# Patient Record
Sex: FEMALE | Race: WHITE
[De-identification: names, ages, dates, MRNs, and addresses within clinical notes are randomized per-mention and may not be internally consistent; named-entity substitution may affect disease eponyms.]

---

## 2018-10-23 ENCOUNTER — HOSPITAL ENCOUNTER (INPATIENT)
Dept: HOSPITAL 97 - DSO | Age: 53
LOS: 7 days | Discharge: HOME HEALTH | DRG: 470 | End: 2018-10-30
Attending: FAMILY MEDICINE | Admitting: ORTHOPAEDIC SURGERY
Payer: COMMERCIAL

## 2018-10-23 DIAGNOSIS — D62: ICD-10-CM
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
## 2018-10-23 DIAGNOSIS — Z98.84: ICD-10-CM

## 2018-10-23 DIAGNOSIS — G89.18: ICD-10-CM

## 2018-10-23 DIAGNOSIS — I10: ICD-10-CM

## 2018-10-23 DIAGNOSIS — E66.9: ICD-10-CM
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
## 2018-10-23 DIAGNOSIS — M17.12: Primary | ICD-10-CM

## 2018-10-23 DIAGNOSIS — T84.84XA: ICD-10-CM

## 2018-10-23 LAB
BUN BLD-MCNC: 8 MG/DL (ref 7–18)
GLUCOSE SERPLBLD-MCNC: 86 MG/DL (ref 74–106)
HCT VFR BLD CALC: 38.7 % (ref 36–45)
INR BLD: 1.04
LYMPHOCYTES # SPEC AUTO: 1.6 K/UL (ref 0.7–4.9)
MCH RBC QN AUTO: 29.6 PG (ref 27–35)
MCV RBC: 87.9 FL (ref 80–100)
PMV BLD: 7.9 FL (ref 7.6–11.3)
POTASSIUM SERPL-SCNC: 4.1 MMOL/L (ref 3.5–5.1)
RBC # BLD: 4.4 M/UL (ref 3.86–4.86)
UA COMPLETE W REFLEX CULTURE PNL UR: (no result)
UA DIPSTICK W REFLEX MICRO PNL UR: (no result)

## 2018-10-23 PROCEDURE — 36415 COLL VENOUS BLD VENIPUNCTURE: CPT

## 2018-10-23 PROCEDURE — 87088 URINE BACTERIA CULTURE: CPT

## 2018-10-23 PROCEDURE — 85018 HEMOGLOBIN: CPT

## 2018-10-23 PROCEDURE — 86900 BLOOD TYPING SEROLOGIC ABO: CPT

## 2018-10-23 PROCEDURE — 71046 X-RAY EXAM CHEST 2 VIEWS: CPT

## 2018-10-23 PROCEDURE — 97163 PT EVAL HIGH COMPLEX 45 MIN: CPT

## 2018-10-23 PROCEDURE — 80048 BASIC METABOLIC PNL TOTAL CA: CPT

## 2018-10-23 PROCEDURE — 85730 THROMBOPLASTIN TIME PARTIAL: CPT

## 2018-10-23 PROCEDURE — 81015 MICROSCOPIC EXAM OF URINE: CPT

## 2018-10-23 PROCEDURE — 93005 ELECTROCARDIOGRAM TRACING: CPT

## 2018-10-23 PROCEDURE — 85025 COMPLETE CBC W/AUTO DIFF WBC: CPT

## 2018-10-23 PROCEDURE — 86901 BLOOD TYPING SEROLOGIC RH(D): CPT

## 2018-10-23 PROCEDURE — 85610 PROTHROMBIN TIME: CPT

## 2018-10-23 PROCEDURE — 86850 RBC ANTIBODY SCREEN: CPT

## 2018-10-23 PROCEDURE — 81003 URINALYSIS AUTO W/O SCOPE: CPT

## 2018-10-23 PROCEDURE — 87086 URINE CULTURE/COLONY COUNT: CPT

## 2018-10-23 PROCEDURE — 85014 HEMATOCRIT: CPT

## 2018-10-23 NOTE — RAD REPORT
EXAM DESCRIPTION:  RAD - Chest Pa And Lat (2 Views) - 10/23/2018 9:53 am

 

CLINICAL HISTORY:  preop

Chest pain.

 

COMPARISON:  Chest Pa And Lat (2 Views) dated 2/23/2017; CHEST SINGLE VIEW dated 10/7/2014; CHEST PA 
AND LAT 2 VIEW dated 7/23/2009; CHEST PA AND LAT 2 VIEW dated 5/11/2005

 

FINDINGS:  The lungs are clear. The heart is normal in size. No displaced fractures. Gastric banding 
noted.

 

IMPRESSION:  No acute or concerning finding suspected.

## 2018-10-23 NOTE — EKG
Test Date:    2018-10-23               Test Time:    09:37:51

Technician:   JENNIFER                                   

                                                     

MEASUREMENT RESULTS:                                       

Intervals:                                           

Rate:         68                                     

OR:           148                                    

QRSD:         74                                     

QT:           420                                    

QTc:          446                                    

Axis:                                                

P:            35                                     

OR:           148                                    

QRS:          21                                     

T:            22                                     

                                                     

INTERPRETIVE STATEMENTS:                                       

                                                     

Normal sinus rhythm

Low voltage QRS

Borderline ECG

Compared to ECG 10/07/2014 12:47:19

Low QRS voltage now present



Electronically Signed On 10-23-18 12:09:51 CDT by Nacho Zamora

## 2018-10-26 LAB — HCT VFR BLD CALC: 35.3 % (ref 36–45)

## 2018-10-26 PROCEDURE — 0SRD0J9 REPLACEMENT OF LEFT KNEE JOINT WITH SYNTHETIC SUBSTITUTE, CEMENTED, OPEN APPROACH: ICD-10-PCS

## 2018-10-26 RX ADMIN — HYDROMORPHONE HYDROCHLORIDE ONE MG: 1 INJECTION, SOLUTION INTRAMUSCULAR; INTRAVENOUS; SUBCUTANEOUS at 14:05

## 2018-10-26 RX ADMIN — PROMETHAZINE HYDROCHLORIDE PRN MG: 25 INJECTION INTRAMUSCULAR; INTRAVENOUS at 19:45

## 2018-10-26 RX ADMIN — CEFAZOLIN ONE MLS: 1 INJECTION, POWDER, FOR SOLUTION INTRAMUSCULAR; INTRAVENOUS; PARENTERAL at 12:46

## 2018-10-26 RX ADMIN — CEFAZOLIN ONE MLS: 1 INJECTION, POWDER, FOR SOLUTION INTRAMUSCULAR; INTRAVENOUS; PARENTERAL at 12:00

## 2018-10-26 RX ADMIN — HYDROMORPHONE HYDROCHLORIDE ONE MG: 1 INJECTION, SOLUTION INTRAMUSCULAR; INTRAVENOUS; SUBCUTANEOUS at 14:09

## 2018-10-26 RX ADMIN — DOCUSATE SODIUM SCH MG: 100 CAPSULE, LIQUID FILLED ORAL at 21:18

## 2018-10-26 RX ADMIN — HYDROMORPHONE HYDROCHLORIDE ONE MG: 1 INJECTION, SOLUTION INTRAMUSCULAR; INTRAVENOUS; SUBCUTANEOUS at 14:20

## 2018-10-26 RX ADMIN — HYDROMORPHONE HYDROCHLORIDE ONE MG: 1 INJECTION, SOLUTION INTRAMUSCULAR; INTRAVENOUS; SUBCUTANEOUS at 14:15

## 2018-10-26 RX ADMIN — CEFAZOLIN SCH MLS: 1 INJECTION, POWDER, FOR SOLUTION INTRAMUSCULAR; INTRAVENOUS; PARENTERAL at 17:55

## 2018-10-26 NOTE — XMS REPORT
[facility]

 Created on:2018



Patient:Marisela Ellsworth

Sex:Female

:1965

External Reference #:[id number]





Demographics







 Address  [address] [place] [address]

 

 Phone  [phone]

 

 Preferred Language  en

 

 Marital Status  Unknown

 

 Moravian Affiliation  Unknown

 

 Race  Unreported/Refused to Report

 

 Ethnic Group  Unknown









Author







 Organization  [employer]









Care Team Providers







 Name  Role  Phone

 

 GuyEvan  Provider Role  Unavailable









Allergies, Adverse Reactions, Alerts







 Substance  Reaction  Event Type

 

 codeine  Info Not Available  Drug Allergy







Problems







 Problem Type  Condition  Code  Onset Dates  Condition Status

 

 Assessment  Primary osteoarthritis of left knee  M17.12    Active

 

 Assessment  Pain in left knee  M25.562    Active

 

 Problem  Primary osteoarthritis of right  M17.11    Active



   knee      

 

 Problem  Primary osteoarthritis of both  M17.0    Active



   knees      

 

 Problem  Primary osteoarthritis of left knee  M17.12    Active

 

 Problem  Pain in left knee  M25.562    Active

 

 Problem  Pain in right knee  M25.561    Active

 

 Problem  Other chronic pain  G89.29    Active







Medications







 Medication  Code  Code  Instructions  Start  End  Status  Dosage



   System      Date  Date    

 

 Omeprazole  NDC  [id number]  40 MG Oral      Active  TK ONE C PO D

 

 Amoxicillin  NDC  [id number]  500 MG Oral      Active  TK 2 CS PO BID

 

 Phentermine HCl  NDC  [id number]  37.5 MG Oral      Active  (Schedule IV



               Drug) TK 1 T



               PO QD.

 

 Suprep Bowel Prep  NDC  [id number]  17.5-3.13-1.6      Active  U UTD BY YOUR



 Kit      GM/180ML Oral        COLONOSCOPY



               PACKET



               INSTRUCTIONS

 

 Clarithromycin  NDC  [id number]  500 MG Oral      Active  TK 1 T PO BID



               FOR 14 DAYS

 

 Metoclopramide  NDC  [id number]  10 MG Oral      Active  TK UTD PER



 HCl              YOUR



               COLONOSCOPY



               PREP PACKET

 

 Triamterene-HCTZ  NDC  [id number]  37.5-25 MG      Active  TK 1 T PO QD



       Oral        







Results

No Known Results



Summary Purpose

[employer] Submission

## 2018-10-26 NOTE — RAD REPORT
EXAM DESCRIPTION:  RAD - Knee Left 2 View - 10/26/2018 2:21 pm

 

CLINICAL HISTORY:  Placement of left total knee prosthesis

 

COMPARISON:  None.

 

FINDINGS:  A left total knee arthroplasty has been performed. Hardware is in expected location. A carina
gical drain is in place. Skin staples are noted.

 

IMPRESSION:  Postoperative left knee with no unexpected finding.

## 2018-10-26 NOTE — XMS REPORT
[facility]

 Created on:2018



Patient:Marisela Ellsworth

Sex:Female

:1965

External Reference #:[id number]





Demographics







 Address  [address] [place] [address]

 

 Phone  [phone]

 

 Preferred Language  en

 

 Marital Status  Unknown

 

 Uatsdin Affiliation  Unknown

 

 Race  Unreported/Refused to Report

 

 Ethnic Group  Unknown









Author







 Organization  [employer]









Care Team Providers







 Name  Role  Phone

 

 Evan Guy  Provider Role  Unavailable









Allergies, Adverse Reactions, Alerts







 Substance  Reaction  Event Type

 

 codeine  Info Not Available  Drug Allergy







Problems







 Problem Type  Condition  Code  Onset Dates  Condition Status

 

 Assessment  Pain in left knee  M25.562    Active

 

 Assessment  Primary osteoarthritis of left knee  M17.12    Active

 

 Problem  Primary osteoarthritis of right  M17.11    Active



   knee      

 

 Problem  Primary osteoarthritis of both  M17.0    Active



   knees      

 

 Problem  Primary osteoarthritis of left knee  M17.12    Active

 

 Problem  Pain in left knee  M25.562    Active

 

 Problem  Pain in right knee  M25.561    Active

 

 Problem  Other chronic pain  G89.29    Active







Medications







 Medication  Code System  Code  Instructions  Start Date  End Date  Status  
Dosage

 

 Omeprazole  NDC  0        Active  not defined







Results

No Known Results



Summary Purpose

[employer] Submission

## 2018-10-26 NOTE — XMS REPORT
[facility]

 Created on:2018



Patient:Marisela Ellsworth

Sex:Female

:1965

External Reference #:[id number]





Demographics







 Address  [address] [place] [address]

 

 Phone  [phone]

 

 Preferred Language  en

 

 Marital Status  Unknown

 

 Buddhism Affiliation  Unknown

 

 Race  Unreported/Refused to Report

 

 Ethnic Group  Unknown









Author







 Organization  [employer]









Care Team Providers







 Name  Role  Phone

 

 Evan Guy  Provider Role  Unavailable









Allergies, Adverse Reactions, Alerts







 Substance  Reaction  Event Type

 

 codeine  Info Not Available  Drug Allergy







Problems







 Problem Type  Condition  Code  Onset Dates  Condition Status

 

 Assessment  Pain in right knee  M25.561    Active

 

 Assessment  Other chronic pain  G89.29    Active

 

 Assessment  Pain in left knee  M25.562    Active

 

 Assessment  Primary osteoarthritis of right  M17.11    Active



   knee      

 

 Assessment  Primary osteoarthritis of left knee  M17.12    Active

 

 Problem  Primary osteoarthritis of right  M17.11    Active



   knee      

 

 Problem  Primary osteoarthritis of both  M17.0    Active



   knees      

 

 Problem  Primary osteoarthritis of left knee  M17.12    Active

 

 Problem  Pain in left knee  M25.562    Active

 

 Problem  Pain in right knee  M25.561    Active

 

 Problem  Other chronic pain  G89.29    Active







Medications







 Medication  Code  Code  Instructions  Start  End  Status  Dosage



   System      Date  Date    

 

 Amoxicillin  NDC  [id number]  500 MG Oral      Active  TK 2 CS PO BID

 

 Clarithromycin  NDC  [id number]  500 MG Oral      Active  TK 1 T PO BID



               FOR 14 DAYS

 

 Triamterene-HCTZ  NDC  [id number]  37.5-25 MG      Active  TK 1 T PO QD



       Oral        

 

 Metoclopramide  NDC  [id number]  10 MG Oral      Active  TK UTD PER



 HCl              YOUR



               COLONOSCOPY



               PREP PACKET

 

 Suprep Bowel Prep  NDC  [id number]  17.5-3.13-1.6      Active  U UTD BY YOUR



 Kit      GM/180ML Oral        COLONOSCOPY



               PACKET



               INSTRUCTIONS

 

 Phentermine HCl  NDC  [id number]  37.5 MG Oral      Active  (Schedule IV



               Drug) TK 1 T



               PO QD.

 

 Omeprazole  NDC  [id number]  40 MG Oral      Active  TK ONE C PO D







Results

No Known Results



Summary Purpose

[employer] Submission

## 2018-10-26 NOTE — XMS REPORT
[facility]

 Created on:2018



Patient:Marisela Ellsworth

Sex:Female

:1965

External Reference #:[id number]





Demographics







 Address  [address] [place] [address]

 

 Phone  [phone]

 

 Preferred Language  en

 

 Marital Status  Unknown

 

 Sikh Affiliation  Unknown

 

 Race  Unreported/Refused to Report

 

 Ethnic Group  Unknown









Author







 Organization  [employer]









Care Team Providers







 Name  Role  Phone

 

 Guy Evan  Provider Role  Unavailable









Allergies, Adverse Reactions, Alerts







 Substance  Reaction  Event Type

 

 codeine  Info Not Available  Drug Allergy







Problems







 Problem Type  Condition  Code  Onset Dates  Condition Status

 

 Assessment  Primary osteoarthritis of right  M17.11    Active



   knee      

 

 Assessment  Pain in right knee  M25.561    Active

 

 Assessment  Primary osteoarthritis of left knee  M17.12    Active

 

 Problem  Other chronic pain  G89.29    Active

 

 Problem  Pain in left knee  M25.562    Active

 

 Problem  Pain in right knee  M25.561    Active

 

 Assessment  Pain in left knee  M25.562    Active

 

 Assessment  Other chronic pain  G89.29    Active

 

 Problem  Primary osteoarthritis of both  M17.0    Active



   knees      







Medications







 Medication  Code  Code  Instructions  Start  End  Status  Dosage



   System      Date  Date    

 

 Omeprazole  NDC  [id number]  40 MG Oral      Active  TK ONE C PO D

 

 Clarithromycin  NDC  [id number]  500 MG Oral      Active  TK 1 T PO BID



               FOR 14 DAYS

 

 Triamterene-HCTZ  NDC  [id number]  37.5-25 MG      Active  TK 1 T PO QD



       Oral        

 

 Amoxicillin  NDC  [id number]  500 MG Oral      Active  TK 2 CS PO BID

 

 Suprep Bowel Prep  NDC  [id number]  17.5-3.13-1.6      Active  U UTD BY YOUR



 Kit      GM/180ML Oral        COLONOSCOPY



               PACKET



               INSTRUCTIONS

 

 Metoclopramide  NDC  [id number]  10 MG Oral      Active  TK UTD PER



 HCl              YOUR



               COLONOSCOPY



               PREP PACKET

 

 Phentermine HCl  NDC  [id number]  37.5 MG Oral      Active  (Schedule IV



               Drug) TK 1 T



               PO QD.







Results

No Known Results



Summary Purpose

[employer] Submission

## 2018-10-27 LAB — HCT VFR BLD CALC: 31.9 % (ref 36–45)

## 2018-10-27 RX ADMIN — HYDROCODONE BITARTRATE AND ACETAMINOPHEN PRN TAB: 7.5; 325 TABLET ORAL at 21:11

## 2018-10-27 RX ADMIN — HYDROCODONE BITARTRATE AND ACETAMINOPHEN PRN TAB: 7.5; 325 TABLET ORAL at 09:42

## 2018-10-27 RX ADMIN — RIVAROXABAN SCH MG: 10 TABLET, FILM COATED ORAL at 06:05

## 2018-10-27 RX ADMIN — DOCUSATE SODIUM SCH MG: 100 CAPSULE, LIQUID FILLED ORAL at 09:42

## 2018-10-27 RX ADMIN — DOCUSATE SODIUM SCH MG: 100 CAPSULE, LIQUID FILLED ORAL at 21:11

## 2018-10-27 RX ADMIN — PROMETHAZINE HYDROCHLORIDE PRN MG: 25 INJECTION INTRAMUSCULAR; INTRAVENOUS at 18:20

## 2018-10-27 RX ADMIN — CEFAZOLIN SCH MLS: 1 INJECTION, POWDER, FOR SOLUTION INTRAMUSCULAR; INTRAVENOUS; PARENTERAL at 01:41

## 2018-10-27 RX ADMIN — HYDROCODONE BITARTRATE AND ACETAMINOPHEN PRN TAB: 7.5; 325 TABLET ORAL at 16:45

## 2018-10-27 NOTE — P.PN
Subjective


Date of Service: 10/27/18


Chief Complaint: TKR


Subjective: Tolerating diet, Ambulating, Improving, Working w/ PT, Doing well





Review of Systems


10-point ROS is otherwise unremarkable





Physical Examination





- Vital Signs


Temperature: 98.4 F


Blood Pressure: 108/58


Pulse: 81


Respirations: 16


Pulse Ox (%): 96





- Physical Exam


General: Alert, In no apparent distress


HEENT: Atraumatic, PERRLA, EOMI


Neck: Supple, JVD not distended


Respiratory: Clear to auscultation bilaterally, Normal air movement


Cardiovascular: Regular rate/rhythm, Normal S1 S2


Gastrointestinal: Normal bowel sounds, No tenderness


Musculoskeletal: Other (Left Knee Wrapped with Ace bandage. SABA drain with 

Serasangious Fluids )


Integumentary: No rashes


Neurological: Normal speech, Normal tone, Normal affect


Lymphatics: No axilla or inguinal lymphadenopathy





- Studies


Laboratory Data (last 24 hrs)





10/27/18 06:02: Hgb 10.7 L, Hct 31.9 L


10/26/18 13:55: Hgb 11.7 L, Hct 35.3 L





Medications List Reviewed: Yes





Assessment And Plan





- Current Problems (Diagnosis)


(1) Status post total left knee replacement


Current Visit: Yes   Status: Acute   


Plan: 


S/p TKR with ortho


-PT consulted. 


-Pain mgmt 


-CPM and Plan per Ortho 











(2) HTN (hypertension)


Current Visit: Yes   Status: Acute   


Plan: 


No H/o HTN. Low BP 


-Continue to monitor. 








Qualifiers: 


   Hypertension type: essential hypertension   Qualified Code(s): I10 - 

Essential (primary) hypertension   


Discharge Plan: Other


Plan to discharge in: 72 Hours





- Code Status/Comfort Care


Code Status Assessed: Yes


Critical Care: No

## 2018-10-27 NOTE — OP
Surgeon:  Philipp Lei MD



Preoperative Diagnosis:  Left knee degenerative joint disease.



Postoperative Diagnosis:  Left knee degenerative joint disease.



Procedure Performed:  Left total knee replacement.



First Assistant:  KASSIE Renner.



Complications:  None.



Disposition:  Recovery room, stable.



Procedure In Detail:  The patient was taken to the operative suite, placed in supine position, induce
d anesthesia.  Left knee was prepped and draped in usual sterile fashion.  No qualified resident was 
available.  A midline skin incision, medial parapatellar arthrotomy.  __________ AP and chamfer cuts 
made from intramedullary reference for 60 mm femoral component.  Proximal tibia was resected 4 mm off
 the most involved side for 67 base plate.  Trial with a 10 poly gave perfect balance of the posterio
r flexion gaps.  A 31 patella was appropriate.  Cemented technique was reserved for patella only with
 a supplemental screw fixation for the tibia.  Permanent implants were placed.  Layered closure was p
erformed.  Hemovac drain was placed above the fascia.  Skin closure was performed.  The patient was b
eing reversed from anesthesia at this time.





ALMA/RENETTA

DD:  10/26/2018 13:38:42Voice ID:  [id number]

DT:  10/27/2018 00:44:45Report ID:  [id number]

## 2018-10-27 NOTE — P.PN
Subjective


Date of Service: 10/27/18


Subjective: No C/O voiced, Tolerating diet, Ambulating, Improving, Doing well (

pain under control at this time   up with p.t. can be d/cd any  time after this 

evem=rambo)





Physical Examination





- Vital Signs


Temperature: 98.4 F


Blood Pressure: 108/58


Pulse: 81


Respirations: 16


Pulse Ox (%): 96





- Studies


Laboratory Data (last 24 hrs)





10/27/18 06:02: Hgb 10.7 L, Hct 31.9 L


10/26/18 13:55: Hgb 11.7 L, Hct 35.3 L

## 2018-10-28 LAB — HCT VFR BLD CALC: 28.3 % (ref 36–45)

## 2018-10-28 RX ADMIN — HYDROCODONE BITARTRATE AND ACETAMINOPHEN PRN TAB: 7.5; 325 TABLET ORAL at 19:57

## 2018-10-28 RX ADMIN — RIVAROXABAN SCH MG: 10 TABLET, FILM COATED ORAL at 05:00

## 2018-10-28 RX ADMIN — DOCUSATE SODIUM SCH MG: 100 CAPSULE, LIQUID FILLED ORAL at 20:01

## 2018-10-28 RX ADMIN — HYDROCODONE BITARTRATE AND ACETAMINOPHEN PRN TAB: 7.5; 325 TABLET ORAL at 05:00

## 2018-10-28 RX ADMIN — DOCUSATE SODIUM SCH MG: 100 CAPSULE, LIQUID FILLED ORAL at 08:45

## 2018-10-28 RX ADMIN — HYDROCODONE BITARTRATE AND ACETAMINOPHEN PRN TAB: 7.5; 325 TABLET ORAL at 00:58

## 2018-10-28 RX ADMIN — HYDROCODONE BITARTRATE AND ACETAMINOPHEN PRN TAB: 7.5; 325 TABLET ORAL at 15:57

## 2018-10-28 RX ADMIN — HYDROCODONE BITARTRATE AND ACETAMINOPHEN PRN TAB: 7.5; 325 TABLET ORAL at 08:45

## 2018-10-28 RX ADMIN — HYDROCODONE BITARTRATE AND ACETAMINOPHEN PRN TAB: 7.5; 325 TABLET ORAL at 11:59

## 2018-10-28 NOTE — P.CNS
Date of Consult: 10/26/18


Reason for Consult: Medical management


Requesting Physician: Philipp Lei


Chief Complaint: TKR


History of Present Illness: 





Patient is a 52-year-old female who presents to the hospital with a total knee 

replacement.  She has tolerated the procedure well however after the procedure 

she has had difficulty with her pain medication.  She has also had nausea.  We 

were consulted to help provide medical assistance in her management.  Patient 

denies any other medical issues.  She has severe osteoarthritis, and she was 

unable to ambulate any longer.  She started having back pain and hip pain. She 

was originally seeing a different physician were she was receiving injections 

which was not benefitting her so she got a 2nd opinion.  Decision was made for 

a total knee replacement as she was unable to do her job effectively and her 

quality of life was suffering significantly.


Allergies





tramadol Allergy (Verified 10/23/18 08:41)


 agitation, nausea





Home Medications: 








NK [No Home Meds]  10/23/18 








- Past Medical/Surgical History


Diabetic: No


-: h.pylori 2/2018


-: athroscopic surgery GALINA knees


-: lap band


-: tonsillectomy





- Family History


  ** Mother


Medical History: Heart disease, Lung disease, Cancer


Notes: COPD, CHF, LUPUS





  ** Father


Medical History: Lung disease


Notes: asthma





- Social History


Alcohol use: Yes


CD- Drugs: No


Caffeine use: Yes


Place of Residence: Home





Review of Systems


10-point ROS is otherwise unremarkable





Physical Examination














Temp Pulse Resp BP Pulse Ox


 


 98.2 F   88   16   128/63   97 


 


 10/28/18 00:00  10/28/18 00:00  10/28/18 00:00  10/28/18 00:00  10/27/18 20:00








General: Alert, In no apparent distress, Oriented x3


HEENT: Atraumatic, PERRLA, Mucous membr. moist/pink, EOMI, Sclerae nonicteric


Neck: Supple, 2+ carotid pulse no bruit, No LAD, Without JVD or thyroid 

abnormality


Respiratory: Clear to auscultation bilaterally, Normal air movement


Cardiovascular: Regular rate/rhythm, Normal S1 S2, No murmurs


Gastrointestinal: Normal bowel sounds, Soft and benign, Non-distended, No 

tenderness


Musculoskeletal: No clubbing, No swelling, Tenderness


Integumentary: No rashes


Neurological: Normal gait, Normal speech, Normal strength at 5/5 x4 extr, 

Normal tone, Normal affect


Lymphatics: No axilla or inguinal lymphadenopathy


Laboratory Data (last 24 hrs)





10/27/18 06:02: Hgb 10.7 L, Hct 31.9 L








- Problems


(1) Status post total left knee replacement


Current Visit: Yes   Status: Acute   


Conclusions/ Impression: 





Plan:


1. Gentle hydration


2. Pain control


3. Physical therapy


4. Passive range of motion


5. Anti emetics


6. Diet as tolerated


7. GI and DVT prophylaxis


Critical Care: No


Time Spent Managing Pts care (In Minutes): 50

## 2018-10-28 NOTE — P.PN
Subjective


Date of Service: 10/28/18


Chief Complaint: TKR


Subjective: Tolerating diet, Ambulating, Improving, Working w/ PT, Doing well





Review of Systems


10-point ROS is otherwise unremarkable





Physical Examination





- Vital Signs


Temperature: 99.3 F


Blood Pressure: 133/62


Pulse: 108


Respirations: 16


Pulse Ox (%): 94





- Physical Exam


General: Alert, In no apparent distress


HEENT: Atraumatic, PERRLA, EOMI


Neck: Supple, JVD not distended


Respiratory: Clear to auscultation bilaterally, Normal air movement


Cardiovascular: Regular rate/rhythm, Normal S1 S2


Gastrointestinal: Normal bowel sounds, No tenderness


Musculoskeletal: Other (Left Knee Wrapped with ACE wrap. SABA drain in place. )


Integumentary: No rashes


Neurological: Normal speech, Normal tone, Normal affect


Lymphatics: No axilla or inguinal lymphadenopathy





- Studies


Laboratory Data (last 24 hrs)





10/28/18 06:02: Hgb 9.6 L, Hct 28.3 L





Medications List Reviewed: Yes





Assessment And Plan





- Current Problems (Diagnosis)


(1) Status post total left knee replacement


Current Visit: Yes   Status: Acute   


Plan: 


S/p TKR with ortho


-PT consulted. 


-Pain mgmt - Considering DC of pump and Switch to PO norco


-CPM and Plan per Ortho 











(2) HTN (hypertension)


Current Visit: Yes   Status: Acute   


Plan: 


No H/o HTN. Low BP 


-Continue to monitor. 








Qualifiers: 


   Hypertension type: essential hypertension   Qualified Code(s): I10 - 

Essential (primary) hypertension   


Discharge Plan: Home


Plan to discharge in: 48 Hours





- Code Status/Comfort Care


Code Status Assessed: Yes


Critical Care: No

## 2018-10-28 NOTE — P.PN
Subjective


Date of Service: 10/28/18


Chief Complaint: TKR


Subjective: No new changes





Review of Systems


10-point ROS is otherwise unremarkable





Physical Examination





- Vital Signs


Temperature: 99.3 F


Blood Pressure: 133/62


Pulse: 108


Respirations: 16


Pulse Ox (%): 94





- Physical Exam


General: In no apparent distress, Oriented x3


HEENT: Atraumatic, Normocephalic


Respiratory: Normal air movement


Cardiovascular: No edema


Musculoskeletal: Other (dressing intact right leg)





- Studies


Laboratory Data (last 24 hrs)





10/28/18 06:02: Hgb 9.6 L, Hct 28.3 L





Medications List Reviewed: Yes





Assessment And Plan





- Plan





s/p tkr stable poor paain control, home tomorrow am


Discharge Plan: Home


Plan to discharge in: 24 Hours

## 2018-10-29 VITALS — OXYGEN SATURATION: 97 %

## 2018-10-29 LAB — HCT VFR BLD CALC: 27.6 % (ref 36–45)

## 2018-10-29 RX ADMIN — DOCUSATE SODIUM SCH MG: 100 CAPSULE, LIQUID FILLED ORAL at 21:08

## 2018-10-29 RX ADMIN — DOCUSATE SODIUM SCH MG: 100 CAPSULE, LIQUID FILLED ORAL at 08:18

## 2018-10-29 RX ADMIN — HYDROCODONE BITARTRATE AND ACETAMINOPHEN PRN TAB: 7.5; 325 TABLET ORAL at 04:29

## 2018-10-29 RX ADMIN — RIVAROXABAN SCH MG: 10 TABLET, FILM COATED ORAL at 05:56

## 2018-10-29 RX ADMIN — HYDROCODONE BITARTRATE AND ACETAMINOPHEN PRN TAB: 7.5; 325 TABLET ORAL at 16:04

## 2018-10-29 NOTE — P.PN
Subjective


Date of Service: 10/29/18


Chief Complaint: TKR


Subjective: Tolerating diet, Ambulating, Improving, Working w/ PT, Doing well





Review of Systems


10-point ROS is otherwise unremarkable





Physical Examination





- Vital Signs


Temperature: 99.5 F


Blood Pressure: 119/58


Pulse: 82


Respirations: 16


Pulse Ox (%): 95





- Physical Exam


General: Alert, In no apparent distress


HEENT: Atraumatic, PERRLA, EOMI


Neck: Supple, JVD not distended


Respiratory: Clear to auscultation bilaterally, Normal air movement


Cardiovascular: Regular rate/rhythm, Normal S1 S2


Gastrointestinal: Normal bowel sounds, No tenderness


Musculoskeletal: Swelling, Tenderness


Integumentary: No rashes


Neurological: Normal speech, Normal tone, Normal affect


Lymphatics: No axilla or inguinal lymphadenopathy





- Studies


Laboratory Data (last 24 hrs)





10/29/18 05:34: Hgb 9.5 L, Hct 27.6 L





Medications List Reviewed: Yes





Assessment And Plan





- Current Problems (Diagnosis)


(1) Status post total left knee replacement


Current Visit: Yes   Status: Acute   


Plan: 


S/p TKR with ortho


-PT consulted. 


-Pain mgmt with PO meds


-CPM and Plan per Ortho 











(2) HTN (hypertension)


Current Visit: Yes   Status: Acute   


Plan: 


No H/o HTN. Low BP 


-Continue to monitor. 








Qualifiers: 


   Hypertension type: essential hypertension   Qualified Code(s): I10 - 

Essential (primary) hypertension   


Discharge Plan: Home


Plan to discharge in: 48 Hours





- Code Status/Comfort Care


Code Status Assessed: Yes


Critical Care: No

## 2018-10-30 VITALS — SYSTOLIC BLOOD PRESSURE: 100 MMHG | TEMPERATURE: 97.9 F | DIASTOLIC BLOOD PRESSURE: 60 MMHG

## 2018-10-30 LAB — HCT VFR BLD CALC: 28.1 % (ref 36–45)

## 2018-10-30 RX ADMIN — RIVAROXABAN SCH MG: 10 TABLET, FILM COATED ORAL at 05:43

## 2018-10-30 RX ADMIN — HYDROCODONE BITARTRATE AND ACETAMINOPHEN PRN TAB: 7.5; 325 TABLET ORAL at 08:07

## 2018-10-30 RX ADMIN — HYDROCODONE BITARTRATE AND ACETAMINOPHEN PRN TAB: 7.5; 325 TABLET ORAL at 15:43

## 2018-10-30 RX ADMIN — HYDROCODONE BITARTRATE AND ACETAMINOPHEN PRN TAB: 7.5; 325 TABLET ORAL at 21:29

## 2018-10-30 RX ADMIN — HYDROCODONE BITARTRATE AND ACETAMINOPHEN PRN TAB: 7.5; 325 TABLET ORAL at 00:08

## 2018-10-30 RX ADMIN — DOCUSATE SODIUM SCH MG: 100 CAPSULE, LIQUID FILLED ORAL at 08:06

## 2018-10-30 NOTE — PN
Date of Progress Note:  10/30/2018



Subjective:  The patient seen and examined.  Chart reviewed and case discussed 
with RN.  The patient is working with PT, however, still having significant 
amount of pain.  The patient requesting pain medication and was doing well.  
Awaiting rehab placement.



Review of Systems:

Negative except as above.



Medications:  List reviewed.



Physical Examination:

Vital Signs:  Temperature 97.4, heart rate 81, blood pressure 108/52, 
respirations 18, O2 98% on room air. 

General:  Awake, alert, oriented x3, in mild distress due to pain.  Obese 
female. 

CV:  S1, S2.  No murmurs.  Regular rate and rhythm.  Peripheral pulses present. 

Respiratory:  Moving air well bilaterally.  No wheezing. 

Gastrointestinal:  Abdomen is soft, nontender, nondistended.  Positive bowel 
sounds. 

Extremities:  No clubbing, cyanosis.  Mild edema in the left lower extremity. 

Musculoskeletal:  Left knee incision in fact clean, dry, and intact. 

Neuro:  Nonfocal.



Laboratory Data:  H and H 9.5 and 28.1.  Urine culture showing mixed stephanie.



Assessment/plan:  A 52-year-old female with:

1.   Status post total left knee replacement.  Continue physical therapy and 
she was doing well.  Continue pain management.  May need to adjust medications 
due to uncontrol pain while the patient is undergoing activity.  The patient 
has been referred to rehab.  Awaiting placement.

2.   Essential hypertension.  Currently blood pressure is on the low side with 
systolic 108. Hold blood pressure medications; not symptomatic at this time.  
We will continue to monitor.

3.   Obesity, BMI 33.

4.   Acute blood loss anemia.  Hemoglobin dropped from 13 to 9.5, likely due to 
recent procedure.  We will continue to monitor H and H.  No need for 
transfusion at this time. 

Plan:  Discharge to rehab once accepted.





/RENETTA

DD:  10/30/2018 14:11:12   Voice ID:  [id number]

DT:  10/30/2018 19:00:52   Report ID:  [id number]

CIRA

## 2018-11-01 NOTE — DS
Date of Discharge:  10/30/2018



Consultant:  Dr. Lei with Orthopedic Surgery.



Procedure:  On 10/26/2018, left total knee replacement.



Admitting Diagnoses:  

1.Status post total left knee replacement.

2.Intractable pain.

3.Severe osteoarthritis, multiple sites, generalized.

4.Obesity.  Body mass index 33.



Discharge Diagnoses:  

1.Status post left total knee replacement.

2.Essential hypertension.

3.Obesity, body mass index 33.

4.Intractable pain, improved.

5.Acute blood loss anemia, secondary to procedure.

6.Severe osteoarthritis, generalized.



Hospital Course:  The patient is a 52-year-old female who came in with a total knee replacement on th
e left by Dr. Lei.  However, post procedure, the patient had significant pain which is intractab
le.  The patient was kept in the hospital for pain management.  She was slowly able to start with phy
sical therapy.  She does have severe osteoarthritis, which prompted her left knee replacement and the
 patient did well postoperatively.  She did have some acute blood loss anemia.  Postprocedure, her he
moglobin dropped from 13 to 9.5; however, remained hemodynamically stable, did not require blood cummings
sfusion.  The patient otherwise improved slowly.  She was initially referred to inpatient rehab, [facility] after improvement in her condition and ability to work well with physical therapy she refused inp
atient rehab and decided to go home with home health and PT.  The patient otherwise is doing well.  S
he was cleared for discharge from orthopedic standpoint.  The patient was discharged home with home 
ealt and PT to follow up with Dr. Lei in 7-10 days for wound check.  Return to ER for worsening
 condition.  Follow with PCP in 3-5 days.



Medications:  As per medication reconciliation list.  The patient would be on Xarelto for deep vein t
hrombosis prophylaxis.



Diet:  Heart healthy.



Activity:  Fall precautions as per Physical Therapy and ortho recommendations. 



For physical exam findings, please see progress note dictated on the day of discharge. 



Total time spent discharging the patient was 41 minutes.





JUAN

DD:  10/31/2018 17:32:27Voice ID:  [id number]

DT:  11/01/2018 04:38:57Report ID:  [id number]

## 2018-12-18 LAB
BUN BLD-MCNC: 9 MG/DL (ref 7–18)
GLUCOSE SERPLBLD-MCNC: 88 MG/DL (ref 74–106)
HCT VFR BLD CALC: 38 % (ref 36–45)
INR BLD: 1.09
LYMPHOCYTES # SPEC AUTO: 1.8 K/UL (ref 0.7–4.9)
PMV BLD: 8.1 FL (ref 7.6–11.3)
POTASSIUM SERPL-SCNC: 4.1 MMOL/L (ref 3.5–5.1)
RBC # BLD: 4.36 M/UL (ref 3.86–4.86)
UA COMPLETE W REFLEX CULTURE PNL UR: (no result)
UA DIPSTICK W REFLEX MICRO PNL UR: (no result)

## 2018-12-18 NOTE — RAD REPORT
EXAM DESCRIPTION:  RAD - Knee Right 2 View - 12/18/2018 2:12 pm

 

CLINICAL HISTORY:  Knee pain, preop examination pending knee surgery

 

COMPARISON:  None.

 

FINDINGS:  AP and lateral weight-bearing views of the right knee obtained. No fracture, dislocation o
r periosteal reaction.No joint effusion seen. Significant medial compartment narrowing is present. Me
dial and lateral compartment marginal spurs are present. Patella femoral joint space narrowing presen
t with significant patella and femoral marginal spurs. No acute or destructive bone process. No forei
gn body or soft tissue abnormality.

 

 

IMPRESSION:  Right knee advanced degenerative change involving the medial compartment and patellofemo
ral joint space.

 

No acute bone or joint finding.

 

Clinical  concerns for internal derangement or occult bony injury could be further  assessed with MR 
imaging.

## 2018-12-28 ENCOUNTER — HOSPITAL ENCOUNTER (INPATIENT)
Dept: HOSPITAL 97 - OR | Age: 53
LOS: 2 days | Discharge: HOME | DRG: 470 | End: 2018-12-30
Attending: ORTHOPAEDIC SURGERY | Admitting: ORTHOPAEDIC SURGERY
Payer: COMMERCIAL

## 2018-12-28 VITALS — BODY MASS INDEX: 32.5 KG/M2

## 2018-12-28 DIAGNOSIS — M17.11: Primary | ICD-10-CM

## 2018-12-28 DIAGNOSIS — J45.909: ICD-10-CM

## 2018-12-28 DIAGNOSIS — D62: ICD-10-CM

## 2018-12-28 DIAGNOSIS — E66.9: ICD-10-CM

## 2018-12-28 PROCEDURE — 97139 UNLISTED THERAPEUTIC PX: CPT

## 2018-12-28 PROCEDURE — 88311 DECALCIFY TISSUE: CPT

## 2018-12-28 PROCEDURE — 36415 COLL VENOUS BLD VENIPUNCTURE: CPT

## 2018-12-28 PROCEDURE — 85018 HEMOGLOBIN: CPT

## 2018-12-28 PROCEDURE — 81015 MICROSCOPIC EXAM OF URINE: CPT

## 2018-12-28 PROCEDURE — 85025 COMPLETE CBC W/AUTO DIFF WBC: CPT

## 2018-12-28 PROCEDURE — 86850 RBC ANTIBODY SCREEN: CPT

## 2018-12-28 PROCEDURE — 80048 BASIC METABOLIC PNL TOTAL CA: CPT

## 2018-12-28 PROCEDURE — 85014 HEMATOCRIT: CPT

## 2018-12-28 PROCEDURE — 86900 BLOOD TYPING SEROLOGIC ABO: CPT

## 2018-12-28 PROCEDURE — 85730 THROMBOPLASTIN TIME PARTIAL: CPT

## 2018-12-28 PROCEDURE — 86901 BLOOD TYPING SEROLOGIC RH(D): CPT

## 2018-12-28 PROCEDURE — 81003 URINALYSIS AUTO W/O SCOPE: CPT

## 2018-12-28 PROCEDURE — 85610 PROTHROMBIN TIME: CPT

## 2018-12-28 PROCEDURE — 97161 PT EVAL LOW COMPLEX 20 MIN: CPT

## 2018-12-28 PROCEDURE — 97116 GAIT TRAINING THERAPY: CPT

## 2018-12-28 PROCEDURE — 88304 TISSUE EXAM BY PATHOLOGIST: CPT

## 2018-12-28 PROCEDURE — 0SRC0J9 REPLACEMENT OF RIGHT KNEE JOINT WITH SYNTHETIC SUBSTITUTE, CEMENTED, OPEN APPROACH: ICD-10-PCS

## 2018-12-28 RX ADMIN — MEPERIDINE HYDROCHLORIDE ONE MG: 50 INJECTION, SOLUTION INTRAMUSCULAR; INTRAVENOUS; SUBCUTANEOUS at 15:30

## 2018-12-28 RX ADMIN — CEFAZOLIN SODIUM SCH MLS: 1 SOLUTION INTRAVENOUS at 17:30

## 2018-12-28 RX ADMIN — MEPERIDINE HYDROCHLORIDE ONE MG: 50 INJECTION, SOLUTION INTRAMUSCULAR; INTRAVENOUS; SUBCUTANEOUS at 15:15

## 2018-12-28 RX ADMIN — SODIUM CHLORIDE, SODIUM LACTATE, POTASSIUM CHLORIDE, AND CALCIUM CHLORIDE ONE MLS: .6; .31; .03; .02 INJECTION, SOLUTION INTRAVENOUS at 15:15

## 2018-12-28 RX ADMIN — MEPERIDINE HYDROCHLORIDE ONE MG: 50 INJECTION, SOLUTION INTRAMUSCULAR; INTRAVENOUS; SUBCUTANEOUS at 15:25

## 2018-12-28 RX ADMIN — MEPERIDINE HYDROCHLORIDE ONE MG: 50 INJECTION, SOLUTION INTRAMUSCULAR; INTRAVENOUS; SUBCUTANEOUS at 15:20

## 2018-12-28 RX ADMIN — SODIUM CHLORIDE, SODIUM LACTATE, POTASSIUM CHLORIDE, AND CALCIUM CHLORIDE ONE MLS: .6; .31; .03; .02 INJECTION, SOLUTION INTRAVENOUS at 15:20

## 2018-12-28 NOTE — XMS REPORT
Patient Summary Document

 Created on:2018



Patient:PEDRO LEMONS

Sex:Female

:1965

External Reference #:[id number]





Demographics







 Address  [address] [place] [address]

 

 Home Phone  [phone]

 

 Work Phone  [phone]

 

 Email Address  [email or web address]

 

 Preferred Language  Unknown

 

 Marital Status  Unknown

 

 Buddhism Affiliation  Unknown

 

 Race  Unknown

 

 Additional Race(s)  Unavailable

 

 Ethnic Group  Unknown









Author







 Organization  [facility]

 

 Address  [address] [place] [address] Dr. Otoole [address] [place] [address]

 

 Phone  [phone]









Care Team Providers







 Name  Role  Phone

 

 Unavailable  Unavailable  Unavailable









Problems

This patient has no known problems.



Allergies, Adverse Reactions, Alerts

This patient has no known allergies or adverse reactions.



Medications

This patient has no known medications.

## 2018-12-28 NOTE — XMS REPORT
[facility]

 Created on:2018



Patient:Marisela Ellsworth

Sex:Female

:1965

External Reference #:[id number]





Demographics







 Address  [address] [place] [address]

 

 Phone  [phone]

 

 Preferred Language  en

 

 Marital Status  Unknown

 

 Scientologist Affiliation  Unknown

 

 Race  Unreported/Refused to Report

 

 Ethnic Group  Unknown









Author







 Organization  [employer]









Care Team Providers







 Name  Role  Phone

 

 Evan Guy  Provider Role  Unavailable









Allergies, Adverse Reactions, Alerts







 Substance  Reaction  Event Type

 

 codeine  Info Not Available  Drug Allergy







Problems







 Problem Type  Condition  Code  Onset Dates  Condition Status

 

 Assessment  Pain in right knee  M25.561    Active

 

 Assessment  Other chronic pain  G89.29    Active

 

 Assessment  Pain in left knee  M25.562    Active

 

 Assessment  Primary osteoarthritis of right  M17.11    Active



   knee      

 

 Assessment  Primary osteoarthritis of left knee  M17.12    Active

 

 Problem  Primary osteoarthritis of right  M17.11    Active



   knee      

 

 Problem  Primary osteoarthritis of both  M17.0    Active



   knees      

 

 Problem  Primary osteoarthritis of left knee  M17.12    Active

 

 Problem  Pain in left knee  M25.562    Active

 

 Problem  Pain in right knee  M25.561    Active

 

 Problem  Other chronic pain  G89.29    Active







Medications







 Medication  Code  Code  Instructions  Start  End  Status  Dosage



   System      Date  Date    

 

 Amoxicillin  NDC  [id number]  500 MG Oral      Active  TK 2 CS PO BID

 

 Clarithromycin  NDC  [id number]  500 MG Oral      Active  TK 1 T PO BID



               FOR 14 DAYS

 

 Triamterene-HCTZ  NDC  89581602387  37.5-25 MG      Active  TK 1 T PO QD



       Oral        

 

 Metoclopramide  NDC  [id number]  10 MG Oral      Active  TK UTD PER



 HCl              YOUR



               COLONOSCOPY



               PREP PACKET

 

 Suprep Bowel Prep  NDC  [id number]  17.5-3.13-1.6      Active  U UTD BY YOUR



 Kit      GM/180ML Oral        COLONOSCOPY



               PACKET



               INSTRUCTIONS

 

 Phentermine HCl  NDC  [id number]  37.5 MG Oral      Active  (Schedule IV



               Drug) TK 1 T



               PO QD.

 

 Omeprazole  NDC  [id number]  40 MG Oral      Active  TK ONE C PO D







Results

No Known Results



Summary Purpose

[employer] Submission

## 2018-12-28 NOTE — XMS REPORT
[facility]

 Created on:2018



Patient:Marisela Ellsworth

Sex:Female

:1965

External Reference #:[id number]





Demographics







 Address  [address] [place] [address]

 

 Phone  [phone]

 

 Preferred Language  en

 

 Marital Status  Unknown

 

 Pentecostal Affiliation  Unknown

 

 Race  Unreported/Refused to Report

 

 Ethnic Group  Unknown









Author







 Organization  [employer]









Care Team Providers







 Name  Role  Phone

 

 GuyEvan  Provider Role  Unavailable









Allergies, Adverse Reactions, Alerts







 Substance  Reaction  Event Type

 

 codeine  Info Not Available  Drug Allergy







Problems







 Problem Type  Condition  Code  Onset Dates  Condition Status

 

 Assessment  Primary osteoarthritis of left knee  M17.12    Active

 

 Assessment  Pain in left knee  M25.562    Active

 

 Problem  Primary osteoarthritis of right  M17.11    Active



   knee      

 

 Problem  Primary osteoarthritis of both  M17.0    Active



   knees      

 

 Problem  Primary osteoarthritis of left knee  M17.12    Active

 

 Problem  Pain in left knee  M25.562    Active

 

 Problem  Pain in right knee  M25.561    Active

 

 Problem  Other chronic pain  G89.29    Active







Medications







 Medication  Code  Code  Instructions  Start  End  Status  Dosage



   System      Date  Date    

 

 Omeprazole  NDC  [id number]  40 MG Oral      Active  TK ONE C PO D

 

 Amoxicillin  NDC  42662594873  500 MG Oral      Active  TK 2 CS PO BID

 

 Phentermine HCl  NDC  [id number]  37.5 MG Oral      Active  (Schedule IV



               Drug) TK 1 T



               PO QD.

 

 Suprep Bowel Prep  NDC  77859523875  17.5-3.13-1.6      Active  U UTD BY YOUR



 Kit      GM/180ML Oral        COLONOSCOPY



               PACKET



               INSTRUCTIONS

 

 Clarithromycin  NDC  [id number]  500 MG Oral      Active  TK 1 T PO BID



               FOR 14 DAYS

 

 Metoclopramide  NDC  [id number]  10 MG Oral      Active  TK UTD PER



 HCl              YOUR



               COLONOSCOPY



               PREP PACKET

 

 Triamterene-HCTZ  NDC  67910470867  37.5-25 MG      Active  TK 1 T PO QD



       Oral        







Results

No Known Results



Summary Purpose

[employer] Submission

## 2018-12-28 NOTE — XMS REPORT
[facility]

 Created on:2018



Patient:Marisela Ellsworth

Sex:Female

:1965

External Reference #:[id number]





Demographics







 Address  [address] [place] [address]

 

 Phone  [phone]

 

 Preferred Language  en

 

 Marital Status  Unknown

 

 Jewish Affiliation  Unknown

 

 Race  Unreported/Refused to Report

 

 Ethnic Group  Unknown









Author







 Organization  [employer]









Care Team Providers







 Name  Role  Phone

 

 Evan Guy  Provider Role  Unavailable









Allergies, Adverse Reactions, Alerts







 Substance  Reaction  Event Type

 

 codeine  Info Not Available  Drug Allergy







Problems







 Problem Type  Condition  Code  Onset Dates  Condition Status

 

 Assessment  Pain in left knee  M25.562    Active

 

 Assessment  Primary osteoarthritis of left knee  M17.12    Active

 

 Problem  Primary osteoarthritis of right  M17.11    Active



   knee      

 

 Problem  Primary osteoarthritis of both  M17.0    Active



   knees      

 

 Problem  Primary osteoarthritis of left knee  M17.12    Active

 

 Problem  Pain in left knee  M25.562    Active

 

 Problem  Pain in right knee  M25.561    Active

 

 Problem  Other chronic pain  G89.29    Active







Medications







 Medication  Code System  Code  Instructions  Start Date  End Date  Status  
Dosage

 

 Omeprazole  NDC  0        Active  not defined







Results

No Known Results



Summary Purpose

[employer] Submission

## 2018-12-28 NOTE — XMS REPORT
[facility]

 Created on:2018



Patient:Marisela Ellsworth

Sex:Female

:1965

External Reference #:[id number]





Demographics







 Address  [address] [place] [address]

 

 Phone  [phone]

 

 Preferred Language  en

 

 Marital Status  Unknown

 

 Faith Affiliation  Unknown

 

 Race  Unreported/Refused to Report

 

 Ethnic Group  Unknown









Author







 Organization  [employer]









Care Team Providers







 Name  Role  Phone

 

 Guy Evan  Provider Role  Unavailable









Allergies, Adverse Reactions, Alerts







 Substance  Reaction  Event Type

 

 codeine  Info Not Available  Drug Allergy







Problems







 Problem Type  Condition  Code  Onset Dates  Condition Status

 

 Assessment  Primary osteoarthritis of right  M17.11    Active



   knee      

 

 Assessment  Pain in right knee  M25.561    Active

 

 Assessment  Primary osteoarthritis of left knee  M17.12    Active

 

 Problem  Other chronic pain  G89.29    Active

 

 Problem  Pain in left knee  M25.562    Active

 

 Problem  Pain in right knee  M25.561    Active

 

 Assessment  Pain in left knee  M25.562    Active

 

 Assessment  Other chronic pain  G89.29    Active

 

 Problem  Primary osteoarthritis of both  M17.0    Active



   knees      







Medications







 Medication  Code  Code  Instructions  Start  End  Status  Dosage



   System      Date  Date    

 

 Omeprazole  NDC  [id number]  40 MG Oral      Active  TK ONE C PO D

 

 Clarithromycin  NDC  91123348820  500 MG Oral      Active  TK 1 T PO BID



               FOR 14 DAYS

 

 Triamterene-HCTZ  NDC  47683883384  37.5-25 MG      Active  TK 1 T PO QD



       Oral        

 

 Amoxicillin  NDC  [id number]  500 MG Oral      Active  TK 2 CS PO BID

 

 Suprep Bowel Prep  NDC  [id number]  17.5-3.13-1.6      Active  U UTD BY YOUR



 Kit      GM/180ML Oral        COLONOSCOPY



               PACKET



               INSTRUCTIONS

 

 Metoclopramide  NDC  [id number]  10 MG Oral      Active  TK UTD PER



 HCl              YOUR



               COLONOSCOPY



               PREP PACKET

 

 Phentermine HCl  NDC  64124968170  37.5 MG Oral      Active  (Schedule IV



               Drug) TK 1 T



               PO QD.







Results

No Known Results



Summary Purpose

[employer] Submission

## 2018-12-28 NOTE — RAD REPORT
EXAM DESCRIPTION:  RAD - Knee Right 2 View - 12/28/2018 3:05 pm

 

CLINICAL HISTORY:  Right knee surgery

 

FINDINGS:  Postsurgical changes of a right knee arthroplasty.

 

Right knee prosthesis is in good position.

 

 

No fracture or dislocation seen

## 2018-12-29 LAB
HCT VFR BLD CALC: 31.6 % (ref 36–45)
UA DIPSTICK W REFLEX MICRO PNL UR: (no result)

## 2018-12-29 RX ADMIN — POLYETHYLENE GLYCOL 3350 SCH GM: 17 POWDER, FOR SOLUTION ORAL at 14:23

## 2018-12-29 RX ADMIN — ENOXAPARIN SODIUM SCH MG: 30 INJECTION SUBCUTANEOUS at 20:28

## 2018-12-29 RX ADMIN — CEFAZOLIN SODIUM SCH MLS: 1 SOLUTION INTRAVENOUS at 01:16

## 2018-12-29 RX ADMIN — ENOXAPARIN SODIUM SCH: 30 INJECTION SUBCUTANEOUS at 09:00

## 2018-12-29 RX ADMIN — DOCUSATE SODIUM SCH MG: 100 CAPSULE, LIQUID FILLED ORAL at 20:28

## 2018-12-29 RX ADMIN — ENOXAPARIN SODIUM SCH MG: 30 INJECTION SUBCUTANEOUS at 06:38

## 2018-12-29 RX ADMIN — CEFAZOLIN SODIUM SCH MLS: 1 SOLUTION INTRAVENOUS at 09:00

## 2018-12-29 NOTE — OP
Surgeon:  Philipp Lei MD



Assistant:  First Assistant:  KASSIE Renner



Preoperative Diagnosis:  Right knee degenerative arthritis.



Postoperative Diagnosis:  Right knee degenerative arthritis.



Procedure Performed:  Right total knee replacement.



Complications:  None.



Disposition:  Recovery room, stable.



Operative Report In Detail:  The patient was taken to the operative suite, placed in supine position,
 induced anesthesia.  Right knee was prepped and draped in usual sterile fashion.  Midline skin incis
ion created medial parapatellar arthrotomy distally.  AP and chamfer cuts made from intramedullary re
ference followed by 4 mm resection off the most involved side of the tibia.  We had previously done h
er other side.  PCL was incompetent.  Box was cut.  A 10 poly gave excellent range of motion and paula
nce.  Porous ingrowth technique selected for the tibia with four 30 mm screws supplemental fixation. 
 The set femur was cemented into place.  A 10 poly placed.  A layered closure was performed after stephen
cement of a 31 cemented patella.  Excellent range of motion and balance of knee was noted.  The patie
nt is being reversed from anesthesia at the time of this dictation.





ALMA/RENETTA

DD:  12/28/2018 14:21:04Voice ID:  [id number]

DT:  12/29/2018 01:11:49Report ID:  [id number]

## 2018-12-29 NOTE — P.PN
Subjective


Date of Service: 12/29/18


Subjective: No new changes, Ambulating, Improving, New changes (probable d/c 

tomorrow am)





Physical Examination





- Vital Signs


Temperature: 99.3 F


Blood Pressure: 130/62


Pulse: 75


Respirations: 20


Pulse Ox (%): 100





- Studies


Laboratory Data (last 24 hrs)





12/29/18 05:19: Hgb 10.8 L, Hct 31.6 L D

## 2018-12-29 NOTE — CON
Date of Consultation:  12/29/2018



Reason For Consultation:  Medical management.



History Of Present Illness:  The patient is a 53-year-old female with past medical history of degener
ative osteoarthritis of the knees and asthma, well controlled with rescue inhaler, who comes in for e
lective surgery for her right knee and successfully underwent a total right knee replacement.  The ron king has been doing well postoperatively.  Her pain is well controlled.  Hospitalist service was con
sulted for medical management.  When seen on the floor, the patient was awake, alert, oriented x3.  P
ain well controlled.



Past Medical History:  Intermittent asthma.  Osteoarthritis of the knees.



Past Surgical History:  Left total knee replacement 6 weeks ago and recent right knee surgery.



Allergies:  TO TRAMADOL.



Medications:  List reviewed.



Family History:  COPD in the family.  Mother had cancer.



Social History:  The patient denies any tobacco use.  Drinks socially.  No illicit drug use.  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  Independent in her activities of daily living.



Review of Systems:

Ten point system reviewed, negative except as per HPI.



Physical Examination:

Vital Signs:  Temperature 99.3, heart rate 75, blood pressure 130/62, respirations 20, O2 100% on cristian
m air. 

General:  Awake, alert, oriented x3.  In mild distress due to pain.  Obese female. 

HEENT:  Normocephalic, atraumatic.  PERRLA.  EOMI.  Moist mucous membranes.  Oropharynx is clear.  No
rmal dentition.  Conjunctiva is anicteric. 

Neck:  Supple.  No JVD.  Trachea midline. 

CV:  S1, S2.  Regular rate and rhythm.  Peripheral pulses present.  No murmurs. 

Respiratory:  Moving air well bilaterally.  No wheezing or stridor.  No use of accessory muscles. 

Gastrointestinal:  Abdomen is soft, nontender, nondistended.  Positive bowel sounds, but no guarding 
or rigidity. 

Extremities:  No clubbing, cyanosis, or edema.  No calf tenderness. 

Musculoskeletal:  Right knee incision site bandaged.  Leg in Ace wrap. 

Neuro:  Cranial nerves 2 through 12 intact grossly.  No focal neurological deficit.  Speech is normal
. 

Skin:  No rashes.  Normal skin turgor.



Laboratory Data:  H and H 10.8 and 31.6.  Knee x-ray from 12/20/2018 shows postsurgical changes and r
ight knee arthroplasty.  Right knee prosthesis in good position.  No fracture or dislocation.



Assessment And Plan:  A 53-year-old female with:

1.Right knee total arthroplasty, doing well postoperatively on Lovenox for deep venous thrombosis pr
ophylaxis.  Pain control with morphine PCA pump, management as per Dr. Lei.

2.Postoperative anemia.  We will monitor H and H, transfuse as needed.

3.Intermittent asthma, well controlled.  Use albuterol p.r.n.

4.Obesity.  BMI 32.6.

5.Gastrointestinal and deep venous thrombosis prophylaxes, addressed.  

Thank you for allowing us to participate in the care of your patient.  We will follow along with you.






JUAN

DD:  12/29/2018 11:28:26Voice ID:  [id number]

DT:  12/29/2018 14:30:32Report ID:  [id number]

## 2018-12-30 VITALS — DIASTOLIC BLOOD PRESSURE: 67 MMHG | TEMPERATURE: 97.7 F | SYSTOLIC BLOOD PRESSURE: 126 MMHG

## 2018-12-30 VITALS — OXYGEN SATURATION: 94 %

## 2018-12-30 LAB — HCT VFR BLD CALC: 27.1 % (ref 36–45)

## 2018-12-30 RX ADMIN — POLYETHYLENE GLYCOL 3350 SCH: 17 POWDER, FOR SOLUTION ORAL at 09:00

## 2018-12-30 RX ADMIN — ENOXAPARIN SODIUM SCH MG: 30 INJECTION SUBCUTANEOUS at 09:42

## 2018-12-30 RX ADMIN — DOCUSATE SODIUM SCH MG: 100 CAPSULE, LIQUID FILLED ORAL at 09:43

## 2018-12-30 NOTE — PN
Date of Progress Note:  12/30/2018



Subjective:  The patient is seen and examined.  Chart reviewed and case discussed with RN.  The patie
nt doing well with physical therapy and range of motion exercises.  Pain is well controlled; passing 
gas, however, no bowel movement today.



Medications:  List reviewed.



Physical Examination:

Vital Signs:  Temperature 97.7, heart rate 91, blood pressure 126/67, respirations 20, O2 97% on room
 air. 

General:  Awake, alert, oriented x3.  Not in any acute distress.  Obese female. 

CV:  S1, S2.  Regular rate and rhythm.  Peripheral pulses present. 

Respiratory:  Moving air well bilaterally.  No wheezing. 

Gastrointestinal:  Abdomen is soft, nontender, nondistended.  Positive bowel sounds. 

Extremities:  No clubbing, cyanosis, or edema. 

Musculoskeletal:  Right knee bandaged and covered with Ace wrap.  Good range of motion. 

Neurologic:  Nonfocal.  Sensation intact to light touch. 

Skin:  Left knee incision site clean, dry, intact.



Laboratory Data:  H and H 9.1, 27.1.



Assessment And Plan:  A 53-year-old female with:

1.Right knee total arthroplasty, doing well postoperatively.  Continue Lovenox for deep venous throm
bosis prophylaxis.  Pain is well controlled.  Management per Dr. Lei.

2.Postoperative anemia, likely due to acute blood loss.  We will monitor H and H, transfuse if below
 7.

3.Intermittent asthma, well controlled.  Use albuterol p.r.n.

4.Obesity, BMI 32.6.

5.Gastrointestinal and deep venous thrombosis prophylaxis addressed.  Continue to follow the patient
 along with you.





JUAN

DD:  12/30/2018 11:00:22Voice ID:  [id number]

DT:  12/30/2018 16:26:25Report ID:  [id number]